# Patient Record
Sex: FEMALE | Race: WHITE | NOT HISPANIC OR LATINO | URBAN - METROPOLITAN AREA
[De-identification: names, ages, dates, MRNs, and addresses within clinical notes are randomized per-mention and may not be internally consistent; named-entity substitution may affect disease eponyms.]

---
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
## 2019-03-25 RX ORDER — LISDEXAMFETAMINE DIMESYLATE 40 MG/1
40 CAPSULE ORAL EVERY MORNING
COMMUNITY

## 2019-03-26 ENCOUNTER — HOSPITAL ENCOUNTER (EMERGENCY)
Facility: HOSPITAL | Age: 19
Discharge: HOME | End: 2019-03-26
Attending: EMERGENCY MEDICINE
Payer: COMMERCIAL

## 2019-03-26 ENCOUNTER — APPOINTMENT (EMERGENCY)
Dept: RADIOLOGY | Facility: HOSPITAL | Age: 19
End: 2019-03-26
Attending: EMERGENCY MEDICINE
Payer: COMMERCIAL

## 2019-03-26 VITALS
WEIGHT: 147 LBS | BODY MASS INDEX: 23.63 KG/M2 | TEMPERATURE: 98 F | DIASTOLIC BLOOD PRESSURE: 59 MMHG | HEIGHT: 66 IN | SYSTOLIC BLOOD PRESSURE: 105 MMHG | RESPIRATION RATE: 18 BRPM | OXYGEN SATURATION: 98 % | HEART RATE: 60 BPM

## 2019-03-26 DIAGNOSIS — R07.89 ATYPICAL CHEST PAIN: Primary | ICD-10-CM

## 2019-03-26 DIAGNOSIS — R10.13 ABDOMINAL PAIN, EPIGASTRIC: ICD-10-CM

## 2019-03-26 LAB
ALBUMIN SERPL-MCNC: 4 G/DL (ref 3.4–5)
ALP SERPL-CCNC: 55 IU/L (ref 35–126)
ALT SERPL-CCNC: 21 IU/L (ref 11–54)
ANION GAP SERPL CALC-SCNC: 9 MEQ/L (ref 3–15)
AST SERPL-CCNC: 22 IU/L (ref 15–41)
ATRIAL RATE: 59
BASOPHILS # BLD: 0.07 K/UL (ref 0.01–0.1)
BASOPHILS NFR BLD: 0.4 %
BILIRUB DIRECT SERPL-MCNC: 0.1 MG/DL
BILIRUB SERPL-MCNC: 0.5 MG/DL (ref 0.3–1.2)
BUN SERPL-MCNC: 16 MG/DL (ref 8–20)
CALCIUM SERPL-MCNC: 9.2 MG/DL (ref 8.9–10.3)
CHLORIDE SERPL-SCNC: 103 MEQ/L (ref 98–109)
CO2 SERPL-SCNC: 23 MEQ/L (ref 22–32)
CREAT SERPL-MCNC: 0.9 MG/DL
DIFFERENTIAL METHOD BLD: ABNORMAL
EOSINOPHIL # BLD: 0.26 K/UL (ref 0.04–0.36)
EOSINOPHIL NFR BLD: 1.6 %
ERYTHROCYTE [DISTWIDTH] IN BLOOD BY AUTOMATED COUNT: 11.9 % (ref 11.7–14.4)
GFR SERPL CREATININE-BSD FRML MDRD: >60 ML/MIN/1.73M*2
GLUCOSE SERPL-MCNC: 93 MG/DL (ref 70–99)
HCG UR QL: NEGATIVE
HCT VFR BLDCO AUTO: 38.3 %
HGB BLD-MCNC: 12.9 G/DL
IMM GRANULOCYTES # BLD AUTO: 0.07 K/UL (ref 0–0.08)
IMM GRANULOCYTES NFR BLD AUTO: 0.4 %
LIPASE SERPL-CCNC: 28 U/L (ref 20–51)
LYMPHOCYTES # BLD: 3.11 K/UL (ref 1.2–3.5)
LYMPHOCYTES NFR BLD: 19.4 %
MCH RBC QN AUTO: 30.7 PG (ref 28–33.2)
MCHC RBC AUTO-ENTMCNC: 33.7 G/DL (ref 32.2–35.5)
MCV RBC AUTO: 91.2 FL (ref 83–98)
MONOCYTES # BLD: 1.09 K/UL (ref 0.28–0.8)
MONOCYTES NFR BLD: 6.8 %
NEUTROPHILS # BLD: 11.46 K/UL (ref 1.7–7)
NEUTS SEG NFR BLD: 71.4 %
NRBC BLD-RTO: 0 %
P AXIS: 25
PDW BLD AUTO: 10 FL (ref 9.4–12.3)
PLATELET # BLD AUTO: 327 K/UL
POTASSIUM SERPL-SCNC: 3.9 MEQ/L (ref 3.6–5.1)
PR INTERVAL: 154
PROT SERPL-MCNC: 7.4 G/DL (ref 6–8.2)
QRS DURATION: 80
QT INTERVAL: 406
QTC CALCULATION(BAZETT): 401
R AXIS: 72
RBC # BLD AUTO: 4.2 M/UL (ref 3.93–5.22)
SODIUM SERPL-SCNC: 135 MEQ/L (ref 136–144)
T WAVE AXIS: 31
TROPONIN I SERPL-MCNC: <0.02 NG/ML
VENTRICULAR RATE: 59
WBC # BLD AUTO: 16.06 K/UL

## 2019-03-26 PROCEDURE — 25800000 HC PHARMACY IV SOLUTIONS: Performed by: PHYSICIAN ASSISTANT

## 2019-03-26 PROCEDURE — 85025 COMPLETE CBC W/AUTO DIFF WBC: CPT | Performed by: PHYSICIAN ASSISTANT

## 2019-03-26 PROCEDURE — 84484 ASSAY OF TROPONIN QUANT: CPT | Performed by: PHYSICIAN ASSISTANT

## 2019-03-26 PROCEDURE — 80048 BASIC METABOLIC PNL TOTAL CA: CPT | Performed by: PHYSICIAN ASSISTANT

## 2019-03-26 PROCEDURE — 93005 ELECTROCARDIOGRAM TRACING: CPT | Performed by: PHYSICIAN ASSISTANT

## 2019-03-26 PROCEDURE — 25000000 HC PHARMACY GENERAL: Performed by: PHYSICIAN ASSISTANT

## 2019-03-26 PROCEDURE — 76705 ECHO EXAM OF ABDOMEN: CPT

## 2019-03-26 PROCEDURE — 36415 COLL VENOUS BLD VENIPUNCTURE: CPT | Performed by: PHYSICIAN ASSISTANT

## 2019-03-26 PROCEDURE — 82248 BILIRUBIN DIRECT: CPT | Performed by: PHYSICIAN ASSISTANT

## 2019-03-26 PROCEDURE — 71046 X-RAY EXAM CHEST 2 VIEWS: CPT

## 2019-03-26 PROCEDURE — 83690 ASSAY OF LIPASE: CPT | Performed by: PHYSICIAN ASSISTANT

## 2019-03-26 PROCEDURE — 99284 EMERGENCY DEPT VISIT MOD MDM: CPT | Mod: 25

## 2019-03-26 PROCEDURE — 63700000 HC SELF-ADMINISTRABLE DRUG: Performed by: PHYSICIAN ASSISTANT

## 2019-03-26 PROCEDURE — 84703 CHORIONIC GONADOTROPIN ASSAY: CPT | Performed by: PHYSICIAN ASSISTANT

## 2019-03-26 RX ORDER — PANTOPRAZOLE SODIUM 40 MG/1
40 TABLET, DELAYED RELEASE ORAL
Qty: 20 TABLET | Refills: 0 | Status: SHIPPED | OUTPATIENT
Start: 2019-03-26 | End: 2019-11-17

## 2019-03-26 RX ORDER — ALUMINUM HYDROXIDE, MAGNESIUM HYDROXIDE, AND SIMETHICONE 1200; 120; 1200 MG/30ML; MG/30ML; MG/30ML
30 SUSPENSION ORAL ONCE
Status: COMPLETED | OUTPATIENT
Start: 2019-03-26 | End: 2019-03-26

## 2019-03-26 RX ORDER — LIDOCAINE HYDROCHLORIDE 20 MG/ML
10 SOLUTION OROPHARYNGEAL ONCE
Status: COMPLETED | OUTPATIENT
Start: 2019-03-26 | End: 2019-03-26

## 2019-03-26 RX ADMIN — LIDOCAINE HYDROCHLORIDE 10 ML: 20 SOLUTION ORAL; TOPICAL at 02:14

## 2019-03-26 RX ADMIN — ALUMINUM HYDROXIDE, MAGNESIUM HYDROXIDE, AND SIMETHICONE 30 ML: 200; 200; 20 SUSPENSION ORAL at 02:13

## 2019-03-26 RX ADMIN — SODIUM CHLORIDE 1000 ML: 900 INJECTION, SOLUTION INTRAVENOUS at 00:45

## 2019-03-26 ASSESSMENT — ENCOUNTER SYMPTOMS
COLOR CHANGE: 0
FEVER: 0
CONSTIPATION: 0
BELCHING: 0
FATIGUE: 0
DIARRHEA: 0
HEADACHES: 0
SHORTNESS OF BREATH: 0
FLATUS: 0
CHILLS: 0
FREQUENCY: 0
BLOOD IN STOOL: 0
SORE THROAT: 1
VOMITING: 0
COUGH: 0
NAUSEA: 0
DIFFICULTY URINATING: 0
ANOREXIA: 0
ACTIVITY CHANGE: 0
HEMATURIA: 0
ABDOMINAL PAIN: 1
CHEST TIGHTNESS: 1
APNEA: 0
DIZZINESS: 0
DYSURIA: 0
LIGHT-HEADEDNESS: 0
APPETITE CHANGE: 0
PALPITATIONS: 0
BACK PAIN: 0
FLANK PAIN: 0
NUMBNESS: 0
DIAPHORESIS: 0
WEAKNESS: 0
ABDOMINAL DISTENTION: 0
MYALGIAS: 0

## 2019-03-26 NOTE — DISCHARGE INSTRUCTIONS
Please return to the ED if you develop worsening symptoms, unable to tolerate fluids by mouth, uncontrollable vomiting or diarrhea, unable to urinate, blood in your stools, vomiting blood, fever, chills, or any other concerning symptoms. Follow up with your healthcare provider for re-evaluation.

## 2019-03-26 NOTE — ED ATTESTATION NOTE
I have personally seen and examined the patient.  I reviewed and agree with physician assistant / nurse practitioner’s assessment and plan of care, with the following exceptions: None  My examination, assessment, and plan of care of Wilner Manzano is as follows:     Exam: Well-appearing, no acute distress, regular rate and rhythm, lungs clear to auscultation, abdomen soft, mild epigastric tenderness to palpation without guarding or rebound, normal pulses, no lower extremity edema or calf tenderness    Assessment and plan: Patient with ongoing epigastric pain that radiates to her chest and throat for 2 weeks, worse with food with decreased appetite currently, worse with activity, only feels short of breath when the abdominal pain is severe, low risk ACS, PE, thoracic aortic dissection, likely GI in nature, symptomatic treatment, close follow-up and strict return instructions given     Francois García DO  03/26/19 1032

## 2019-03-26 NOTE — ED PROVIDER NOTES
HPI     Chief Complaint   Patient presents with   • Abdominal Pain     all symptoms worse with exercise   • Sore Throat   • Chest Pain     PT is a 18yF with PMH of ADHD who presents to the ED for evaluation of abdominal pain, chest tightness and sore throat, worse with exercise, which have been occurring over the last 2 weeks.   Pt reports that symptoms have worsened over the last two days and over the weekend and today she had to stop playing soccer multiple times due to sharp pains in her upper abdomen. She reports she has had chest tightness and discomfort in her throat associated with the abdominal discomfort, but the abdominal pain has caused the most discomfort.    History provided by:  Patient  Abdominal Pain   Pain location: upper abdomen.  Pain quality: sharp    Pain radiates to:  Does not radiate  Pain severity:  Moderate  Onset quality:  Sudden  Timing:  Intermittent  Progression:  Unable to specify (no pain at present time)  Chronicity:  New  Context: not alcohol use, not previous surgeries, not recent illness, not sick contacts and not suspicious food intake    Relieved by: rest, cease of exercise.  Exacerbated by: only with significant exertion, not with most activity.  Associated symptoms: chest pain and sore throat    Associated symptoms: no anorexia, no belching, no chills, no constipation, no cough, no diarrhea, no dysuria, no fatigue, no fever, no flatus, no hematuria, no melena, no nausea, no shortness of breath and no vomiting    Chest pain:     Quality: tightness      Severity:  Moderate    Onset quality:  Sudden    Timing:  Intermittent  Sore throat:     Severity:  Mild    Onset quality:  Sudden    Timing:  Intermittent       Patient History     Past Medical History:   Diagnosis Date   • ADHD        Past Surgical History:   Procedure Laterality Date   • WISDOM TOOTH EXTRACTION         History reviewed. No pertinent family history.    Social History   Substance Use Topics   • Smoking status:  "Never Smoker   • Smokeless tobacco: Never Used   • Alcohol use Yes      Comment: occasionaly       Systems Reviewed from Nursing Triage:          Review of Systems     Review of Systems   Constitutional: Negative for activity change, appetite change, chills, diaphoresis, fatigue and fever.   HENT: Positive for sore throat. Negative for nosebleeds.    Eyes: Negative for visual disturbance.   Respiratory: Positive for chest tightness. Negative for apnea, cough and shortness of breath.    Cardiovascular: Positive for chest pain. Negative for palpitations and leg swelling.   Gastrointestinal: Positive for abdominal pain. Negative for abdominal distention, anorexia, blood in stool, constipation, diarrhea, flatus, melena, nausea and vomiting.   Genitourinary: Negative for difficulty urinating, dysuria, flank pain, frequency, hematuria and urgency.   Musculoskeletal: Negative for back pain and myalgias.   Skin: Negative for color change, pallor and rash.   Neurological: Negative for dizziness, syncope, weakness, light-headedness, numbness and headaches.   All other systems reviewed and are negative.       Physical Exam     ED Triage Vitals [03/25/19 2346]   Temp Heart Rate Resp BP SpO2   36.3 °C (97.4 °F) 88 15 118/74 97 %      Temp Source Heart Rate Source Patient Position BP Location FiO2 (%) (Set)   Tympanic -- -- -- --                     Patient Vitals for the past 24 hrs:   BP Temp Temp src Pulse Resp SpO2 Height Weight   03/26/19 0215 119/65 - - 71 16 97 % - -   03/25/19 2346 118/74 36.3 °C (97.4 °F) Tympanic 88 15 97 % 1.67 m (5' 5.75\") 66.7 kg (147 lb)           Physical Exam   Constitutional: She is oriented to person, place, and time. She appears well-developed and well-nourished. No distress.   HENT:   Head: Normocephalic and atraumatic.   Right Ear: Tympanic membrane, external ear and ear canal normal.   Left Ear: Tympanic membrane, external ear and ear canal normal.   Nose: Nose normal.   Mouth/Throat: Uvula " is midline, oropharynx is clear and moist and mucous membranes are normal.   Eyes: Conjunctivae and EOM are normal. Pupils are equal, round, and reactive to light.   Neck: Normal range of motion. Neck supple.   Cardiovascular: Normal rate, regular rhythm, normal heart sounds and intact distal pulses.    No murmur heard.  Pulmonary/Chest: Effort normal and breath sounds normal. No respiratory distress. She exhibits no tenderness.   Abdominal: Soft. Bowel sounds are normal. She exhibits no distension. There is no tenderness. There is no rebound and no guarding.   Musculoskeletal: Normal range of motion. She exhibits no edema.   Lymphadenopathy:     She has no cervical adenopathy.   Neurological: She is alert and oriented to person, place, and time.   Skin: Skin is warm and dry. Capillary refill takes less than 2 seconds.   Nursing note and vitals reviewed.           Procedures    ED Course & MDM     Labs Reviewed   BASIC METABOLIC PANEL - Abnormal        Result Value    Sodium 135 (*)     Potassium 3.9      Chloride 103      CO2 23      BUN 16      Creatinine 0.9      Glucose 93      Calcium 9.2      eGFR >60.0      Anion Gap 9     CBC - Abnormal     WBC 16.06 (*)     RBC 4.20      Hemoglobin 12.9      Hematocrit 38.3      MCV 91.2      MCH 30.7      MCHC 33.7      RDW 11.9      Platelets 327      MPV 10.0     DIFF COUNT - Abnormal     Differential Type Auto      nRBC 0.0      Immature Granulocytes 0.4      Neutrophils 71.4      Lymphocytes 19.4      Monocytes 6.8      Eosinophils 1.6      Basophils 0.4      Immature Granulocytes, Absolute 0.07      Neutrophils, Absolute 11.46 (*)     Lymphocytes, Absolute 3.11      Monocytes, Absolute 1.09 (*)     Eosinophils, Absolute 0.26      Basophils, Absolute 0.07     TROPONIN I - Normal    Troponin I <0.02     LIPASE - Normal    Lipase 28     HEPATIC FUNCTION PANEL - Normal    Albumin 4.0      Bilirubin, Total 0.5      Bilirubin, Direct 0.1      Alkaline Phosphatase 55       AST (SGOT) 22      ALT (SGPT) 21      Total Protein 7.4     BHCG, SERUM, QUAL - Normal    Preg Test, Serum Negative     CBC AND DIFFERENTIAL    Narrative:     The following orders were created for panel order CBC and differential.  Procedure                               Abnormality         Status                     ---------                               -----------         ------                     CBC[[id number]]                           Abnormal            Final result               Diff Count[[id number]]                    Abnormal            Final result                 Please view results for these tests on the individual orders.       X-RAY CHEST 2 VIEWS   ED Interpretation   NAD.   Interpreted contemporaneously by me with Dr. García, attending.    Please refer to final result as interpreted by radiologist for complete detailed description/findings of the study.      ECG 12 lead    (Results Pending)   ULTRASOUND ABDOMEN LIMITED    (Results Pending)               MDM         ED Course as of Mar 26 0350   Tue Mar 26, 2019   0151 Mild leukocytosis. No additional acute abnormalities on lab workup.   CXR shows NAD.  [KL]   0158 Change of shift. Case discussed with ED attending pending reevaluation after GI cocktail and RUQ US.      [KL]      ED Course User Index  [KL] Steph Serrano PA C         Clinical Impressions as of Mar 26 0350   Shortness of breath   Atypical chest pain        Steph Serrano PA C  03/26/19 0350

## 2019-07-11 ENCOUNTER — TELEPHONE (OUTPATIENT)
Dept: FAMILY MEDICINE CLINIC | Facility: CLINIC | Age: 19
End: 2019-07-11

## 2019-07-11 NOTE — TELEPHONE ENCOUNTER
Pt's mother called asking if pt can establish care with you  Pt's mother Ruth Burgos is a pt here  Pt's only medical problems are ADHD which she takes Vyvanse for and possible Exercise Induced Asthma  She is scheduled for 08/12/2019 at 11:30am with you

## 2019-07-25 ENCOUNTER — TELEPHONE (OUTPATIENT)
Dept: FAMILY MEDICINE CLINIC | Facility: CLINIC | Age: 19
End: 2019-07-25

## 2019-07-25 NOTE — TELEPHONE ENCOUNTER
Generally it would take at least several weeks to get into the specialist so her appointment here is probably going to be before that would never happen  What with the diagnosis through the emergency room? Can we call Ricky Menchaca and speak to her about the symptoms and possibly give her some advice  Which she be seeing a GI specialist near school or here in [facility]?

## 2019-07-25 NOTE — TELEPHONE ENCOUNTER
Pt's mom Leslie Chan called the office on her daughter's behalf  Ionmichael Wallace has an upcoming new pt appointment scheduled for 8/12/19  Pt's mom left a message on the referral line this morning at 8:47 am that her daughter was in the Ed and she has been having stomach problems mom was looking for a referral to GI  Do you need to see patient before referring?

## 2019-07-25 NOTE — TELEPHONE ENCOUNTER
I called the home phone and it is mom's voicemail I requested Cyn Sandhu please return the office's call

## 2019-07-26 NOTE — TELEPHONE ENCOUNTER
I spoke with Craig's mom Armando Johnson I asked if she can please have Paris Calero return the office's call since she is 22 I can not speak with mom unless I have a signed hippa form Armando Johnson will have Paris Calero call the office

## 2019-08-08 NOTE — TELEPHONE ENCOUNTER
Venu Mckee has an appointment Monday 8/12/19  Pt's mom was going to have Venu Mckee call the office  On Monday  we will have to find out the name of the ER pt went to near school and request records

## 2019-08-12 ENCOUNTER — OFFICE VISIT (OUTPATIENT)
Dept: FAMILY MEDICINE CLINIC | Facility: CLINIC | Age: 19
End: 2019-08-12
Payer: COMMERCIAL

## 2019-08-12 VITALS
WEIGHT: 144.4 LBS | TEMPERATURE: 97.8 F | BODY MASS INDEX: 22.66 KG/M2 | HEIGHT: 67 IN | SYSTOLIC BLOOD PRESSURE: 90 MMHG | OXYGEN SATURATION: 99 % | HEART RATE: 70 BPM | DIASTOLIC BLOOD PRESSURE: 60 MMHG

## 2019-08-12 DIAGNOSIS — J45.990 EXERCISE-INDUCED ASTHMA: ICD-10-CM

## 2019-08-12 DIAGNOSIS — Z00.00 WELL ADULT HEALTH CHECK: Primary | ICD-10-CM

## 2019-08-12 PROCEDURE — 94010 BREATHING CAPACITY TEST: CPT | Performed by: FAMILY MEDICINE

## 2019-08-12 PROCEDURE — 99385 PREV VISIT NEW AGE 18-39: CPT | Performed by: FAMILY MEDICINE

## 2019-08-12 RX ORDER — LISDEXAMFETAMINE DIMESYLATE 40 MG/1
CAPSULE ORAL
Refills: 0 | COMMUNITY
Start: 2019-05-06

## 2019-08-12 RX ORDER — NORETHINDRONE ACETATE AND ETHINYL ESTRADIOL 1MG-20(24)
1 KIT ORAL DAILY
Refills: 0 | COMMUNITY
Start: 2019-05-28 | End: 2020-06-02 | Stop reason: SDUPTHER

## 2019-08-12 RX ORDER — ALBUTEROL SULFATE 90 UG/1
2 AEROSOL, METERED RESPIRATORY (INHALATION) EVERY 6 HOURS PRN
Qty: 1 INHALER | Refills: 5 | Status: SHIPPED | OUTPATIENT
Start: 2019-08-12 | End: 2020-10-28

## 2019-08-12 RX ORDER — MONTELUKAST SODIUM 10 MG/1
10 TABLET ORAL
Qty: 90 TABLET | Refills: 3 | Status: SHIPPED | OUTPATIENT
Start: 2019-08-12

## 2019-08-12 NOTE — PROGRESS NOTES
Assessment/Plan:     Diagnoses and all orders for this visit:    Well adult health check    Exercise-induced asthma  -     Cancel: POCT spirometry  -     Cancel: POCT spirometry  -     POCT spirometry  -     montelukast (SINGULAIR) 10 mg tablet; Take 1 tablet (10 mg total) by mouth daily at bedtime  -     albuterol (PROVENTIL HFA,VENTOLIN HFA) 90 mcg/act inhaler; Inhale 2 puffs every 6 (six) hours as needed for wheezing or shortness of breath    Other orders  -     VYVANSE 40 MG capsule; TAKE 1 CAPSULE BY MOUTH DAILY AT 8 A M   -     BLISOVI 24 FE 1-20 MG-MCG(24) per tablet; Take 1 tablet by mouth daily        1  Well adult physical-continue fitness and dietary compliance  2   Possible exercise-induced asthma/asthma-spirometry is indicative of moderate degree  Montelukast and rescue inhaler started and full instructions given  Patient will update us and if symptoms are persistent will add controller  3   ADD-patient followed by counseling and [facility] for prescription refills  Her physical form for Sports indicated sickle cell trait testing  Patient not at risk based on ethnicity  She would like to wait having screening blood work but if required (which is not clear on the form), we will do so  Subjective:   Chief Complaint   Patient presents with   [facility]      ADHD, sports induced asthma; pt would like forms filled out for school, and pt would like to discuss he asthma      Patient ID: Sweta Champion is a 20 y o  female  Patient is a 29-year-old female who presents today is new patient to establish care  Her mother comes to this practice  Patient has formally been followed through [facility]  As she got older she was more recently with [facility] [place]  Lorenza Zheng On birth control to control heavy cycles  These were prescribed to family practice  Also seeing [facility] for ADD and is on Vyvanse  Needs form completed for college sports    Patient attends [employer]  Patient is not at risk for sickle cell and prefers waiving testing for same  Patient is main concern is questionable "exercise-induced asthma"  She has had allergy symptoms throughout her life with sneezing  Multiple optimal triggers  She was never diagnosed with asthma or had to use an inhaler  She is training pretty significantly for soccer  [facility]  Immunizations up-to-date  The following portions of the patient's history were reviewed and updated as appropriate: allergies, current medications, past family history, past medical history, past social history, past surgical history and problem list     Review of Systems   Constitutional: Negative for appetite change and fatigue  HENT: Negative for dental problem  Eyes: Negative for visual disturbance  Respiratory: Chest tightness:  with exercise  Shortness of breath: With exercise  Cardiovascular: Negative for chest pain and palpitations  Gastrointestinal: Negative  Genitourinary:        Heavy periods controlled with OCPs   Musculoskeletal: Arthralgias:  no sports injury  Skin: Negative  Neurological: Negative  Psychiatric/Behavioral: Negative  Objective:      BP 90/60   Pulse 70   Temp 97 8 °F (36 6 °C)   Ht 5' 6 5" (1 689 m)   Wt 65 5 kg (144 lb 6 4 oz)   SpO2 99%   BMI 22 96 kg/m²          Physical Exam   Constitutional: She is oriented to person, place, and time  She appears well-developed and well-nourished  62-year-old female with normal BMI   HENT:   Head: Normocephalic  Left Ear: External ear normal    Nose: Nose normal    Mouth/Throat: Oropharynx is clear and moist    Eyes: Pupils are equal, round, and reactive to light  Cardiovascular: Normal rate, regular rhythm, normal heart sounds and intact distal pulses  No extrasystoles are present  Pulmonary/Chest: Effort normal and breath sounds normal  No respiratory distress  Abdominal: Soft  Bowel sounds are normal    Musculoskeletal: Normal range of motion  She exhibits no edema  Neurological: She is alert and oriented to person, place, and time  Psychiatric: She has a normal mood and affect  Her behavior is normal  Judgment and thought content normal    Vitals reviewed  Spirometry done in the office without bronchodilation reveals moderate airway obstructive pattern  Patient had good effort  No comparison available

## 2019-08-13 NOTE — PATIENT INSTRUCTIONS
Exercise-Induced Asthma, Ambulatory Care   GENERAL INFORMATION:   Exercise-induced asthma  is a temporary inflammation and narrowing of your airways  Exercise-induced asthma (EIA) occurs during or 5 to 10 minutes after strenuous exercise  Irritants such as pollution, allergens, or cold, dry air may trigger an EIA attack  Your risk of EIA is increased if you have asthma  Common symptoms include the following:   · Coughing after exercise    · Wheezing after exercise    · Chest pain during or after exercise    · Feeling out of shape when you exercise, even though you are in good physical condition  Seek immediate care for the following symptoms:   · Severe shortness of breath    · Lips or nails are blue or gray    · The skin around your neck and ribs pulls in when you breathe  Treatment for exercise-induced asthma  includes medicines to help decrease inflammation, open airways, and make it easier to breathe  Short-acting medicine is taken right before strenuous exercise, or when you have symptoms  Long-acting medicine is taken daily to help prevent an exercise-induced attack  You may also need medicine to control allergies that trigger your symptoms  Manage and prevent exercise-induced asthma:   · Avoid known triggers  such as dust or pollen  · Choose exercise that requires only short bursts of intense breathing  such as baseball, wrestling, or sprinting  Avoid exercise that requires intense breathing for long periods  · Warm up  before you exercise  · Wear a mask over your mouth when you exercise in cold weather  This will help warm the air you breathe  Follow up with your healthcare provider as directed:  Write down your questions so you remember to ask them during your visits  CARE AGREEMENT:   You have the right to help plan your care  Learn about your health condition and how it may be treated  Discuss treatment options with your caregivers to decide what care you want to receive   You always have the right to refuse treatment  The above information is an  only  It is not intended as medical advice for individual conditions or treatments  Talk to your doctor, nurse or pharmacist before following any medical regimen to see if it is safe and effective for you  © 2014 [address] is for End User's use only and may not be sold, redistributed or otherwise used for commercial purposes  All illustrations and images included in CareNotes® are the copyrighted property of A D A M , Inc  or Faisal Ledezma

## 2019-10-02 NOTE — DISCHARGE INSTRUCTIONS
Please call and follow-up with your primary care doctor or your [facility] next 2-3 days for evaluation.  Take the antibiotics as prescribed and return here if your symptoms change get worse or if you have any other concerns.  
written material/skill demonstration/verbal instruction

## 2019-11-17 ENCOUNTER — HOSPITAL ENCOUNTER (EMERGENCY)
Facility: HOSPITAL | Age: 19
Discharge: HOME | End: 2019-11-17
Attending: EMERGENCY MEDICINE
Payer: COMMERCIAL

## 2019-11-17 VITALS
HEIGHT: 66 IN | RESPIRATION RATE: 16 BRPM | DIASTOLIC BLOOD PRESSURE: 78 MMHG | TEMPERATURE: 98.1 F | WEIGHT: 143 LBS | OXYGEN SATURATION: 100 % | HEART RATE: 98 BPM | SYSTOLIC BLOOD PRESSURE: 120 MMHG | BODY MASS INDEX: 22.98 KG/M2

## 2019-11-17 DIAGNOSIS — S09.90XA CLOSED HEAD INJURY, INITIAL ENCOUNTER: Primary | ICD-10-CM

## 2019-11-17 PROCEDURE — 99283 EMERGENCY DEPT VISIT LOW MDM: CPT

## 2019-11-17 PROCEDURE — 63700000 HC SELF-ADMINISTRABLE DRUG: Performed by: PHYSICIAN ASSISTANT

## 2019-11-17 RX ORDER — ACETAMINOPHEN 325 MG/1
975 TABLET ORAL ONCE
Status: COMPLETED | OUTPATIENT
Start: 2019-11-17 | End: 2019-11-17

## 2019-11-17 RX ADMIN — ACETAMINOPHEN 975 MG: 325 TABLET, FILM COATED ORAL at 22:37

## 2019-11-17 SDOH — HEALTH STABILITY: MENTAL HEALTH: HOW OFTEN DO YOU HAVE A DRINK CONTAINING ALCOHOL?: 2-3 TIMES A WEEK

## 2019-11-17 ASSESSMENT — ENCOUNTER SYMPTOMS
LOSS OF CONSCIOUSNESS: 0
DIZZINESS: 0
DOUBLE VISION: 0
HEADACHES: 1
NUMBNESS: 0
BLURRED VISION: 0
NECK PAIN: 1
DISORIENTATION: 0
VOMITING: 0
NAUSEA: 0

## 2019-11-18 NOTE — ED ATTESTATION NOTE
I have personally seen and examined the patient.  I reviewed and agree with physician assistant / nurse practitioner’s assessment and plan of care, with the following exceptions: None  My examination, assessment, and plan of care of Wilner Manzano is as follows:     Exam: Well-appearing, no acute distress, awake alert oriented x3, no midline C-spine tenderness, Nexus negative, mild bilateral paraspinal tenderness to palpation without guarding or rebound, full range of motion, nonfocal neuro exam, occipital area with small hematoma, no bogginess or crepitus    Assessment and plan: Patient with mechanical fall and head injury, no worrisome features and normal exam, symptomatic treatment and outpatient follow-up given strict return and follow-up instructions    Pulse ox 100% normal     Francois García,   11/18/19 0052

## 2019-11-18 NOTE — DISCHARGE INSTRUCTIONS
Follow up with concussion clinic as needed. Take Tylenol as needed for headache.     Return to the ED for worsening of symptoms or any problems or concerns.  It is very important to follow up with your healthcare provider for re-evaluation.

## 2019-11-18 NOTE — ED PROVIDER NOTES
HPI     Chief Complaint   Patient presents with   • Head Injury       20yo female, otherwise healthy, presenting to the ER after head injury. Pt says she was standing on a bed about 4 feet up hanging lights in her room when she lost her balance and fell backwards. She says her upper back/shoulders struck the ground first and then she hit the back of her head. She denies LOC, blurred vision, n/v. She reports very mild and intermittent headache. She also has some mild neck pain but had normal ROM after the injury. Incident happened about 2.5 hours ago.       History provided by:  Patient   used: No    Head Injury   Location:  Occipital  Mechanism of injury: fall    Fall:     Fall occurred:  From a bed    Point of impact: shoulders, head.    Entrapped after fall: no    Pain details:     Quality:  Aching    Severity:  Mild    Timing:  Intermittent    Progression:  Unchanged  Chronicity:  New  Relieved by:  None tried  Worsened by:  Nothing  Associated symptoms: headache and neck pain    Associated symptoms: no blurred vision, no disorientation, no double vision, no loss of consciousness, no nausea, no numbness and no vomiting         Patient History     Past Medical History:   Diagnosis Date   • ADHD        Past Surgical History:   Procedure Laterality Date   • IMPLANT     • WISDOM TOOTH EXTRACTION         No family history on file.    Social History     Tobacco Use   • Smoking status: Never Smoker   • Smokeless tobacco: Never Used   Substance Use Topics   • Alcohol use: Yes     Frequency: 2-3 times a week     Comment: occasionaly   • Drug use: No       Systems Reviewed from Nursing Triage:          Review of Systems     Review of Systems   Eyes: Negative for blurred vision and double vision.   Gastrointestinal: Negative for nausea and vomiting.   Musculoskeletal: Positive for neck pain.   Neurological: Positive for headaches. Negative for dizziness, loss of consciousness and numbness.         "Physical Exam     ED Triage Vitals [11/17/19 2212]   Temp Heart Rate Resp BP SpO2   36.7 °C (98.1 °F) 98 16 120/78 100 %      Temp Source Heart Rate Source Patient Position BP Location FiO2 (%) (Set)   Tympanic -- Sitting Right upper arm --                     Patient Vitals for the past 24 hrs:   BP Temp Temp src Pulse Resp SpO2 Height Weight   11/17/19 2212 120/78 36.7 °C (98.1 °F) Tympanic 98 16 100 % 1.67 m (5' 5.75\") 64.9 kg (143 lb)                                       Physical Exam   Constitutional: She is oriented to person, place, and time. She appears well-developed and well-nourished.   HENT:   Head: Normocephalic and atraumatic.   Mouth/Throat: Oropharynx is clear and moist.   Eyes: Pupils are equal, round, and reactive to light. EOM are normal.   Neck: Normal range of motion. Neck supple. Muscular tenderness present. No spinous process tenderness present. Normal range of motion present.   Musculoskeletal: Normal range of motion.   Neurological: She is alert and oriented to person, place, and time. No cranial nerve deficit or sensory deficit. She exhibits normal muscle tone. Coordination normal.   Skin: Skin is warm. Capillary refill takes less than 2 seconds.   Psychiatric: She has a normal mood and affect.   Nursing note and vitals reviewed.           Procedures    ED Course & MDM     Labs Reviewed - No data to display    No orders to display               MDM           ED Course as of Nov 17 2255   Sun Nov 17, 2019   2224 Pt AAO x 3, very mild headache. Cleared by NEXUS and [place] CT Head rule. Will d/c with concussion clinic follow up as needed.     [KL]      ED Course User Index  [KL] Dillon David PA C         Clinical Impressions as of Nov 17 2255   Closed head injury, initial encounter        Dillon David PA C  11/17/19 2255    "

## 2020-06-02 DIAGNOSIS — N94.6 DYSMENORRHEA: Primary | ICD-10-CM

## 2020-06-02 RX ORDER — NORETHINDRONE ACETATE AND ETHINYL ESTRADIOL 1MG-20(24)
1 KIT ORAL DAILY
Qty: 28 TABLET | Refills: 11 | Status: SHIPPED | OUTPATIENT
Start: 2020-06-02

## 2020-10-28 DIAGNOSIS — J45.990 EXERCISE-INDUCED ASTHMA: ICD-10-CM

## 2025-07-10 ENCOUNTER — TELEPHONE (OUTPATIENT)
Dept: FAMILY MEDICINE CLINIC | Facility: CLINIC | Age: 25
End: 2025-07-10

## 2025-07-10 NOTE — TELEPHONE ENCOUNTER
[facility] Please remove Janki Li DO as Primary Care Provider.

## 2025-07-14 ENCOUNTER — DOCUMENTATION (OUTPATIENT)
Dept: ADMINISTRATIVE | Facility: OTHER | Age: 25
End: 2025-07-14

## 2025-07-14 NOTE — TELEPHONE ENCOUNTER
07/14/25 1:41 PM     The office's request has been received and reviewed.    The PCP has been successfully removed with a patient attribution note.     This message will now be completed.    Thank you  Cally Odom MA